# Patient Record
Sex: FEMALE | Race: WHITE
[De-identification: names, ages, dates, MRNs, and addresses within clinical notes are randomized per-mention and may not be internally consistent; named-entity substitution may affect disease eponyms.]

---

## 2019-03-15 ENCOUNTER — HOSPITAL ENCOUNTER (INPATIENT)
Dept: HOSPITAL 26 - MED | Age: 69
LOS: 2 days | Discharge: HOME | DRG: 310 | End: 2019-03-17
Attending: INTERNAL MEDICINE | Admitting: INTERNAL MEDICINE
Payer: COMMERCIAL

## 2019-03-15 VITALS — DIASTOLIC BLOOD PRESSURE: 88 MMHG | SYSTOLIC BLOOD PRESSURE: 139 MMHG

## 2019-03-15 VITALS — HEIGHT: 65 IN | WEIGHT: 192 LBS | BODY MASS INDEX: 31.99 KG/M2

## 2019-03-15 DIAGNOSIS — E11.65: ICD-10-CM

## 2019-03-15 DIAGNOSIS — E66.9: ICD-10-CM

## 2019-03-15 DIAGNOSIS — Z90.49: ICD-10-CM

## 2019-03-15 DIAGNOSIS — M19.90: ICD-10-CM

## 2019-03-15 DIAGNOSIS — Z88.8: ICD-10-CM

## 2019-03-15 DIAGNOSIS — I47.1: Primary | ICD-10-CM

## 2019-03-15 DIAGNOSIS — Z79.84: ICD-10-CM

## 2019-03-15 DIAGNOSIS — M79.7: ICD-10-CM

## 2019-03-15 DIAGNOSIS — Z79.899: ICD-10-CM

## 2019-03-15 DIAGNOSIS — N30.10: ICD-10-CM

## 2019-03-15 DIAGNOSIS — Z90.710: ICD-10-CM

## 2019-03-15 DIAGNOSIS — I10: ICD-10-CM

## 2019-03-15 LAB
ALBUMIN FLD-MCNC: 3.8 G/DL (ref 3.4–5)
ANION GAP SERPL CALCULATED.3IONS-SCNC: 15.5 MMOL/L (ref 8–16)
AST SERPL-CCNC: 24 U/L (ref 15–37)
BASOPHILS # BLD AUTO: 0.1 K/UL (ref 0–0.22)
BASOPHILS NFR BLD AUTO: 0.6 % (ref 0–2)
BILIRUB SERPL-MCNC: 0.4 MG/DL (ref 0–1)
BUN SERPL-MCNC: 14 MG/DL (ref 7–18)
CHLORIDE SERPL-SCNC: 99 MMOL/L (ref 98–107)
CO2 SERPL-SCNC: 26.7 MMOL/L (ref 21–32)
CREAT SERPL-MCNC: 1.2 MG/DL (ref 0.6–1.3)
EOSINOPHIL # BLD AUTO: 0.3 K/UL (ref 0–0.4)
EOSINOPHIL NFR BLD AUTO: 2.5 % (ref 0–4)
ERYTHROCYTE [DISTWIDTH] IN BLOOD BY AUTOMATED COUNT: 13.8 % (ref 11.6–13.7)
GFR SERPL CREATININE-BSD FRML MDRD: 57 ML/MIN (ref 90–?)
GLUCOSE SERPL-MCNC: 224 MG/DL (ref 74–106)
HCT VFR BLD AUTO: 43.3 % (ref 36–48)
HGB BLD-MCNC: 14.4 G/DL (ref 12–16)
LYMPHOCYTES # BLD AUTO: 5.9 K/UL (ref 2.5–16.5)
LYMPHOCYTES NFR BLD AUTO: 52.3 % (ref 20.5–51.1)
MCH RBC QN AUTO: 28 PG (ref 27–31)
MCHC RBC AUTO-ENTMCNC: 33 G/DL (ref 33–37)
MCV RBC AUTO: 83.6 FL (ref 80–94)
MONOCYTES # BLD AUTO: 1 K/UL (ref 0.8–1)
MONOCYTES NFR BLD AUTO: 8.6 % (ref 1.7–9.3)
NEUTROPHILS # BLD AUTO: 4.1 K/UL (ref 1.8–7.7)
NEUTROPHILS NFR BLD AUTO: 36 % (ref 42.2–75.2)
PLATELET # BLD AUTO: 236 K/UL (ref 140–450)
POTASSIUM SERPL-SCNC: 4.2 MMOL/L (ref 3.5–5.1)
RBC # BLD AUTO: 5.18 MIL/UL (ref 4.2–5.4)
SODIUM SERPL-SCNC: 137 MMOL/L (ref 136–145)
WBC # BLD AUTO: 11.3 K/UL (ref 4.8–10.8)

## 2019-03-15 NOTE — NUR
BIB SELF CO RACING HEART RATE SINCE 12 PM. CURRENT HR: 150. EKG IN TRIAGE SHOWS 
SVT. PT IS A/O X4. DENIES CHEST PAIN, SOB,  OR PAIN OF ANY KIND. PT REPORTS SHE 
HAS HAD TACHYCARDIC EPISODES IN THE PAST AND HAS BEEN ABLE TO MANAGE WITH VAGAL 
MANUEVERS. TODAY PT STATES SHE TRIED SUBMERGING FACE IN ICE WATER BUT IT DID 
NOT HELP. PT IS CALM, RESPONDING TO QUESTIONS, AND FOLLOWS COMMANDS. ERMD MADE 
AWARE AND IS AT BEDSIDE.

## 2019-03-15 NOTE — NUR
DR FISHER AT BEDSIDE, PT HEARTRATE 88 AT THIS TIME.  OK TO HOLD ADNOSINE AND 
CONTINUE TO MONITOR 

-------------------------------------------------------------------------------

Addendum: 03/15/19 at 2230 by [facility]

-------------------------------------------------------------------------------

ADENOSINE 6 MG

## 2019-03-16 VITALS — DIASTOLIC BLOOD PRESSURE: 63 MMHG | SYSTOLIC BLOOD PRESSURE: 139 MMHG

## 2019-03-16 VITALS — SYSTOLIC BLOOD PRESSURE: 140 MMHG | DIASTOLIC BLOOD PRESSURE: 62 MMHG

## 2019-03-16 VITALS — DIASTOLIC BLOOD PRESSURE: 71 MMHG | SYSTOLIC BLOOD PRESSURE: 145 MMHG

## 2019-03-16 VITALS — DIASTOLIC BLOOD PRESSURE: 63 MMHG | SYSTOLIC BLOOD PRESSURE: 133 MMHG

## 2019-03-16 VITALS — DIASTOLIC BLOOD PRESSURE: 67 MMHG | SYSTOLIC BLOOD PRESSURE: 128 MMHG

## 2019-03-16 VITALS — SYSTOLIC BLOOD PRESSURE: 161 MMHG | DIASTOLIC BLOOD PRESSURE: 74 MMHG

## 2019-03-16 LAB
ANION GAP SERPL CALCULATED.3IONS-SCNC: 11.5 MMOL/L (ref 8–16)
BUN SERPL-MCNC: 16 MG/DL (ref 7–18)
CHLORIDE SERPL-SCNC: 101 MMOL/L (ref 98–107)
CO2 SERPL-SCNC: 27.5 MMOL/L (ref 21–32)
CREAT SERPL-MCNC: 1.1 MG/DL (ref 0.6–1.3)
EOSINOPHIL NFR BLD MANUAL: 3 % (ref 0–4)
ERYTHROCYTE [DISTWIDTH] IN BLOOD BY AUTOMATED COUNT: 13.7 % (ref 11.6–13.7)
GFR SERPL CREATININE-BSD FRML MDRD: 64 ML/MIN (ref 90–?)
GLUCOSE SERPL-MCNC: 175 MG/DL (ref 74–106)
HCT VFR BLD AUTO: 39.7 % (ref 36–48)
HGB BLD-MCNC: 13.2 G/DL (ref 12–16)
LYMPHOCYTES NFR BLD MANUAL: 57 % (ref 20–46)
MCH RBC QN AUTO: 28 PG (ref 27–31)
MCHC RBC AUTO-ENTMCNC: 33 G/DL (ref 33–37)
MCV RBC AUTO: 83.1 FL (ref 80–94)
MONOCYTES NFR BLD MANUAL: 3 % (ref 5–12)
PLATELET # BLD AUTO: 210 K/UL (ref 140–450)
POTASSIUM SERPL-SCNC: 4 MMOL/L (ref 3.5–5.1)
RBC # BLD AUTO: 4.78 MIL/UL (ref 4.2–5.4)
SODIUM SERPL-SCNC: 136 MMOL/L (ref 136–145)
T4 FREE SERPL-MCNC: 1.08 NG/DL (ref 0.76–1.46)
TSH SERPL DL<=0.05 MIU/L-ACNC: 5.06 UIU/ML (ref 0.34–3.74)
WBC # BLD AUTO: 9.5 K/UL (ref 4.8–10.8)

## 2019-03-16 RX ADMIN — SODIUM CHLORIDE SCH MLS/HR: 9 INJECTION, SOLUTION INTRAVENOUS at 15:00

## 2019-03-16 RX ADMIN — SODIUM CHLORIDE SCH MLS/HR: 9 INJECTION, SOLUTION INTRAVENOUS at 03:13

## 2019-03-16 RX ADMIN — HYDROCODONE BITARTRATE AND ACETAMINOPHEN PRN TAB: 5; 325 TABLET ORAL at 12:20

## 2019-03-16 RX ADMIN — HYDROCODONE BITARTRATE AND ACETAMINOPHEN PRN TAB: 5; 325 TABLET ORAL at 21:32

## 2019-03-16 NOTE — NUR
PT IS AWAKE ANDSEATED ON THE BED WITH WIFE ON THE BEDSIDE, VERBALIZED A PAIN RATE OF 7/10 ON 
HER LOWER ABDOMEN,VITAL SIGNS CHECKED DONE AND BP /74, PULSE IS 90, O2 SATURATION IS 
96%, PAIN MEDICATION WAS GIVEN AND PT TOLERATED IT. NO OTHER UNTOWARD SYMPTOM NOTED. WILL 
MONITOR PT.

## 2019-03-16 NOTE — NUR
PT IS AMBULATING TO THE HALLWAY, NO SOB NOTED, PT ASKED FOR HAND TOWELS AND CLEAN GOWN AND 
WANTED TO FRESHEN UP HERSELF.

## 2019-03-16 NOTE — NUR
PT IS AWAKE AND VITAL SIGNS TAKEN, BP /71, PULSE IS 82, O2 SATURATION IS 95%, , NO 
SIGN OF DISTRESS NOTED AND WILL MONITOR PT.

## 2019-03-16 NOTE — NUR
PT ARRIVED TO UNIT VIA GURNEY. AMBULATED TO BED UNASSISTED. RECEIVED REPORT FROM ER NURSE 
EMILY-HAIDER. PT AOX4, ON ROOM AIR WITH LEFT AC #20G. DISCUSSED PLAN OF CARE AND PT VERBALIZED 
UNDERSTANDING. VITAL SIGNS TAKEN AND TOLERATED WELL. NO S/S OF RESPIRATORY DISTRESS OR 
DISCOMFORT NOTED AT THIS TIME. MRSA NARES SWAB COLLECTED AND TOLERATED WELL. ORIENTED PT TO 
BEDROOM, CALL LIGHT, BED, BATHROOM. BED IN LOWEST POSITION, BED BREAKS ON, BOTH SIDE RAILS 
UP AND FALL PRECAUTIONS IN PLACE. BEDSIDE TABLE AND CALL LIGHT ARE WITHIN REACH. WILL 
CONTINUE TO MONITOR.

## 2019-03-16 NOTE — NUR
ENDORSED PT TO NIGHT SHIFT NURSEED FOR CONTINUITY OF CARE, PT IS STABLE AT THIS TIME 
WIT WIFE ON THE BEDSIDE.

## 2019-03-16 NOTE — NUR
Patient will be admitted to care of Dr. Bailon. Admited to Telemetry.  Will go 
to room 112A. Belongings list completed.  Report to HAIDER Mari.

## 2019-03-16 NOTE — NUR
REPORT RECEIVED FROM AM NURSE AT BEDSIDE. PT IN STABLE CONDITION. AAOX4. INTRODUCED SELF TO 
PT. BOARD UPDATED. NO COMPLAINTS OF PAIN. NO SOB. AFEBRILE. IV SITE L AC 20G RUNNING 
NS@75ML/HR PATENT AND INTACT. SKIN WARM, DRY, AND INTACT WITH NO OPEN WOUNDS. BED LOCKED IN 
LOW POSITION. CALL BELL WITHIN REACH. SAFETY PRECAUTIONS IN PLACE. ALL NEEDS MET AT THIS 
TIME.

## 2019-03-16 NOTE — NUR
PT IS AWAKE AND VITAL SIGNS WAS CHECKED AND BP /67, PULSE IS 85, O2 SATURATION IS 95%, 
ORAL MEDCAITION WAS GIVEN TO PT AND PT TOLERATED IT. WILL MONITOR PT.

## 2019-03-16 NOTE — NUR
SPOKE TO KERRY MEZA AND INFORMED MD THAT PT'S BP /82, PULSE IS 90, DR. PAYNE MADE A 
TELEPHONE ORDER TO GIVE PT LABETALOL 100MG POX1 DOSE, ORDER READ BACK AND VERIFIED. WILL 
CARRY OUT MD ORDER.

## 2019-03-16 NOTE — NUR
PT AWAKE AND ALERT ON HER PHONE IN BED. NO S/S OF DISTRESS NOTED. BREATHING EVEN, UNLABORED, 
AND WNL. WILL CONTINUE TO MONITOR.

## 2019-03-16 NOTE — NUR
PT IS AWAKE AND SEATED ON THE BED TALKING TO WIFE ON THE BEDSIDE, VITAL SIGNS TAKEN, BP IS 
139/63, PULSE IS 75, TEMPERATURE IS 98, O2 SATURATION IS 96% AND PT DENIES PAIN AT THIS 
TIME.

## 2019-03-16 NOTE — NUR
PAGED KERRY MEZA TO INFORM MD OF THE PT'S BP RESULT /82, PULSE IS 90, RECHECKED AFTER 
2 MINS AND BP /74, PULSE IS 88. AWAITING FOR MD CALL BACK.

## 2019-03-17 VITALS — DIASTOLIC BLOOD PRESSURE: 69 MMHG | SYSTOLIC BLOOD PRESSURE: 133 MMHG

## 2019-03-17 VITALS — SYSTOLIC BLOOD PRESSURE: 138 MMHG | DIASTOLIC BLOOD PRESSURE: 65 MMHG

## 2019-03-17 VITALS — DIASTOLIC BLOOD PRESSURE: 80 MMHG | SYSTOLIC BLOOD PRESSURE: 148 MMHG

## 2019-03-17 VITALS — DIASTOLIC BLOOD PRESSURE: 71 MMHG | SYSTOLIC BLOOD PRESSURE: 150 MMHG

## 2019-03-17 LAB
ANION GAP SERPL CALCULATED.3IONS-SCNC: 15.7 MMOL/L (ref 8–16)
BASOPHILS # BLD AUTO: 0 K/UL (ref 0–0.22)
BASOPHILS NFR BLD AUTO: 0.6 % (ref 0–2)
BUN SERPL-MCNC: 14 MG/DL (ref 7–18)
CHLORIDE SERPL-SCNC: 104 MMOL/L (ref 98–107)
CO2 SERPL-SCNC: 25.3 MMOL/L (ref 21–32)
CREAT SERPL-MCNC: 0.9 MG/DL (ref 0.6–1.3)
EOSINOPHIL # BLD AUTO: 0.2 K/UL (ref 0–0.4)
EOSINOPHIL NFR BLD AUTO: 2.6 % (ref 0–4)
ERYTHROCYTE [DISTWIDTH] IN BLOOD BY AUTOMATED COUNT: 13.9 % (ref 11.6–13.7)
GFR SERPL CREATININE-BSD FRML MDRD: 80 ML/MIN (ref 90–?)
GLUCOSE SERPL-MCNC: 156 MG/DL (ref 74–106)
HCT VFR BLD AUTO: 39.1 % (ref 36–48)
HGB BLD-MCNC: 13 G/DL (ref 12–16)
LYMPHOCYTES # BLD AUTO: 3.9 K/UL (ref 2.5–16.5)
LYMPHOCYTES NFR BLD AUTO: 53.5 % (ref 20.5–51.1)
MAGNESIUM SERPL-MCNC: 1.8 MG/DL (ref 1.8–2.4)
MCH RBC QN AUTO: 28 PG (ref 27–31)
MCHC RBC AUTO-ENTMCNC: 33 G/DL (ref 33–37)
MCV RBC AUTO: 83.5 FL (ref 80–94)
MONOCYTES # BLD AUTO: 0.6 K/UL (ref 0.8–1)
MONOCYTES NFR BLD AUTO: 8.9 % (ref 1.7–9.3)
NEUTROPHILS # BLD AUTO: 2.5 K/UL (ref 1.8–7.7)
NEUTROPHILS NFR BLD AUTO: 34.4 % (ref 42.2–75.2)
PHOSPHATE SERPL-MCNC: 3.7 MG/DL (ref 2.5–4.9)
PLATELET # BLD AUTO: 187 K/UL (ref 140–450)
POTASSIUM SERPL-SCNC: 4 MMOL/L (ref 3.5–5.1)
RBC # BLD AUTO: 4.68 MIL/UL (ref 4.2–5.4)
SODIUM SERPL-SCNC: 141 MMOL/L (ref 136–145)
WBC # BLD AUTO: 7.3 K/UL (ref 4.8–10.8)

## 2019-03-17 RX ADMIN — SODIUM CHLORIDE SCH MLS/HR: 9 INJECTION, SOLUTION INTRAVENOUS at 04:40

## 2019-03-17 RX ADMIN — HYDROCODONE BITARTRATE AND ACETAMINOPHEN PRN TAB: 5; 325 TABLET ORAL at 08:26

## 2019-03-17 NOTE — NUR
PT SLEEPING BUT AROUSABLE. NO S/S OF DISTRESS NOTED. BREATHING EVEN, UNLABORED, AND WNL. 
WILL CONTINUE TO MONITOR.

## 2019-03-17 NOTE — NUR
DISCHARGE INSTRUCTIONS PROVIDED TO PATIENT IN PREFERRED LANGUAGE OF ENGLISH. FOLLOW-UP VISIT 
WITH PCP, NEW/CHANGED MEDICATION REGIMEN AND SIDE EFFECTS, DIET REGIMEN, AND TO FOLLOW-UP 
WITH CARDIOLOGIST. ANSWERED ALL OF PATIENT/FAMILY QUESTIONS REGARDING DISCHARGE. 
PATIENT/FAMILY VERBALIZED COMPLETE UNDERSTANDING. IV SITE REMOVED WITH MINIMAL BLOOD AND 
LUMEN COMPLETELY INTACT. ID BANDS REMOVED. ESCORTED PATIENT TO LOBBY VIA WHEELCHAIR. PATIENT 
DISCHARGED AT THIS TIME IN PRIVATE VEHICLE TO HOME IN STABLE CONDITION.

## 2019-03-17 NOTE — NUR
PT SLEEPING COMFORTABLY IN BED BUT AROUSABLE. NO S/S OF DISTRESS NOTED. NO COMPLAINTS OF 
PAIN. NO SOB. AFEBRILE. WILL CONTINUE TO MONITOR.

## 2019-03-17 NOTE — NUR
PATIENT SITTING IN BED WATCHING TV. COMPLAINS OF LOW BACK PAIN, NORCO GIVEN. OTHER. 
SCHEDULED MEDICATIONS DUE GIVEN. WILL CONTINUE TO MONITOR.

## 2019-03-17 NOTE — NUR
PATIENT SITTING IN BED TALKING WITH FAMILY MEMBER AT BEDSIDE. NO DISTRESS NOTED. DENIES ANY 
PAIN. WILL CONTINUE TO MONITOR.

## 2019-03-17 NOTE — NUR
PATIENT HAS BEEN SCREENED AND CATEGORIZED AS MODERATE NUTRITION RISK. PATIENT WILL BE SEEN 
WITHIN 3-5 DAYS OF ADMISSION.



3/18/19-3/20/19



KALEN FIGUEROA RD

## 2019-03-17 NOTE — NUR
RECEIVED REPORT FROM NIGHT SHIFT NURSE. PATIENT SITTING IN BED WATCHING TV. NO DISTRESS 
NOTED. COMPLAINS OF LOW BACK PAIN, WILL ADMINISTER NORCO. AAOX4, CALM, COOPERATIVE, SKIN 
COLOR APPROPRIATE TO ETHNICITY, WARM TO TOUCH. SKIN INTACT. IV SITE INTACT, PATENT, AND 
INFUSING IVF AS PER MD ORDERS. ABDOMEN SOFT. RESPIRATIONS EVEN, UNLABORED, ON ROOM AIR. 
REVIEWED PLAN OF CARE WITH PATIENT. PATIENT VERBALIZED UNDERSTANDING. SAFETY MEASURES IN 
PLACE, CALL LIGHT WITHIN REACH. WILL CONTINUE TO MONITOR.

## 2019-03-17 NOTE — NUR
PT SLEEPING COMFORTABLY IN BED BUT AROUSABLE. NO S/S OF DISTRESS NOTED. BREATHING EVEN, 
UNLABORED, AND WNL. WILL CONTINUE TO MONITOR.

## 2019-03-18 NOTE — NUR
RECEIVED CALL FROM KRYS FROM [facility].   REQUESTED ER REPORT AND H&P TO BE FAXED TO HER.   
FAXED FACE SHEET, ER REPORT AND H&P TO HER.   TOLD HER NO DC SUMMARY YET.   [phone] 
  PHONE [phone]

## 2025-01-06 NOTE — NUR
Orders:    Ambulatory Referral to Neurology; Future     RECEIVED PT FROM NIGHT SHIFT NURSEFELICIA, PT IS AWAKE AND SEATED ON THE BED WITH SIDE RAILS 
UP AND CALL LIGHT WITHIN REACH, PT DENIES PAIN AND NO SOB NOTED, PT HAS AN IV LINE ON THE 
LEFT AC G. 20 WITH NS AT 75ML/HR INFUSING AND INTACT. NO SIGN OF DISTRESS NOTED AND WILL 
CONTINUE TO MONITOR PT.